# Patient Record
Sex: FEMALE | Race: OTHER | URBAN - METROPOLITAN AREA
[De-identification: names, ages, dates, MRNs, and addresses within clinical notes are randomized per-mention and may not be internally consistent; named-entity substitution may affect disease eponyms.]

---

## 2017-12-21 ENCOUNTER — EMERGENCY (EMERGENCY)
Facility: HOSPITAL | Age: 27
LOS: 1 days | Discharge: HOME | End: 2017-12-21
Attending: EMERGENCY MEDICINE | Admitting: EMERGENCY MEDICINE
Payer: COMMERCIAL

## 2017-12-21 VITALS
HEART RATE: 90 BPM | SYSTOLIC BLOOD PRESSURE: 107 MMHG | OXYGEN SATURATION: 100 % | RESPIRATION RATE: 18 BRPM | TEMPERATURE: 99 F | DIASTOLIC BLOOD PRESSURE: 70 MMHG

## 2017-12-21 VITALS
RESPIRATION RATE: 18 BRPM | DIASTOLIC BLOOD PRESSURE: 64 MMHG | OXYGEN SATURATION: 96 % | HEART RATE: 60 BPM | SYSTOLIC BLOOD PRESSURE: 110 MMHG | TEMPERATURE: 98 F

## 2017-12-21 DIAGNOSIS — R10.13 EPIGASTRIC PAIN: ICD-10-CM

## 2017-12-21 DIAGNOSIS — R11.2 NAUSEA WITH VOMITING, UNSPECIFIED: ICD-10-CM

## 2017-12-21 DIAGNOSIS — R19.7 DIARRHEA, UNSPECIFIED: ICD-10-CM

## 2017-12-21 DIAGNOSIS — J45.909 UNSPECIFIED ASTHMA, UNCOMPLICATED: ICD-10-CM

## 2017-12-21 LAB
APTT BLD: 31.2 SEC — SIGNIFICANT CHANGE UP (ref 27.5–37.4)
BASOPHILS NFR BLD AUTO: 0.1 % — SIGNIFICANT CHANGE UP (ref 0–2)
EOSINOPHIL NFR BLD AUTO: 0.6 % — SIGNIFICANT CHANGE UP (ref 0–6)
HCT VFR BLD CALC: 41.5 % — SIGNIFICANT CHANGE UP (ref 34.5–45)
HGB BLD-MCNC: 14.7 G/DL — SIGNIFICANT CHANGE UP (ref 11.5–15.5)
INR BLD: 1.14 — SIGNIFICANT CHANGE UP (ref 0.88–1.16)
LYMPHOCYTES # BLD AUTO: 9.4 % — LOW (ref 13–44)
MCHC RBC-ENTMCNC: 30.6 PG — SIGNIFICANT CHANGE UP (ref 27–34)
MCHC RBC-ENTMCNC: 35.4 G/DL — SIGNIFICANT CHANGE UP (ref 32–36)
MCV RBC AUTO: 86.5 FL — SIGNIFICANT CHANGE UP (ref 80–100)
MONOCYTES NFR BLD AUTO: 7.1 % — SIGNIFICANT CHANGE UP (ref 2–14)
NEUTROPHILS NFR BLD AUTO: 82.8 % — HIGH (ref 43–77)
PLATELET # BLD AUTO: 234 K/UL — SIGNIFICANT CHANGE UP (ref 150–400)
PROTHROM AB SERPL-ACNC: 12.7 SEC — SIGNIFICANT CHANGE UP (ref 9.8–12.7)
RBC # BLD: 4.8 M/UL — SIGNIFICANT CHANGE UP (ref 3.8–5.2)
RBC # FLD: 12.6 % — SIGNIFICANT CHANGE UP (ref 10.3–16.9)
WBC # BLD: 12.7 K/UL — HIGH (ref 3.8–10.5)
WBC # FLD AUTO: 12.7 K/UL — HIGH (ref 3.8–10.5)

## 2017-12-21 PROCEDURE — 99284 EMERGENCY DEPT VISIT MOD MDM: CPT | Mod: 25

## 2017-12-21 RX ORDER — ONDANSETRON 8 MG/1
4 TABLET, FILM COATED ORAL ONCE
Qty: 0 | Refills: 0 | Status: COMPLETED | OUTPATIENT
Start: 2017-12-21 | End: 2017-12-21

## 2017-12-21 RX ORDER — FAMOTIDINE 10 MG/ML
20 INJECTION INTRAVENOUS ONCE
Qty: 0 | Refills: 0 | Status: COMPLETED | OUTPATIENT
Start: 2017-12-21 | End: 2017-12-21

## 2017-12-21 RX ORDER — SODIUM CHLORIDE 9 MG/ML
1000 INJECTION INTRAMUSCULAR; INTRAVENOUS; SUBCUTANEOUS ONCE
Qty: 0 | Refills: 0 | Status: COMPLETED | OUTPATIENT
Start: 2017-12-21 | End: 2017-12-21

## 2017-12-21 RX ORDER — KETOROLAC TROMETHAMINE 30 MG/ML
30 SYRINGE (ML) INJECTION ONCE
Qty: 0 | Refills: 0 | Status: DISCONTINUED | OUTPATIENT
Start: 2017-12-21 | End: 2017-12-21

## 2017-12-21 RX ADMIN — ONDANSETRON 4 MILLIGRAM(S): 8 TABLET, FILM COATED ORAL at 23:22

## 2017-12-21 RX ADMIN — FAMOTIDINE 20 MILLIGRAM(S): 10 INJECTION INTRAVENOUS at 23:22

## 2017-12-21 RX ADMIN — SODIUM CHLORIDE 1000 MILLILITER(S): 9 INJECTION INTRAMUSCULAR; INTRAVENOUS; SUBCUTANEOUS at 23:22

## 2017-12-21 NOTE — ED PROVIDER NOTE - PROGRESS NOTE DETAILS
pt feeling better, no vomiting. abd soft.    I have discussed the discharge plan with the patient. The patient agrees with the plan, as discussed.  The patient understands Emergency Department diagnosis is a preliminary diagnosis often based on limited information and that the patient must adhere to the follow-up plan as discussed.  The patient understands that if the symptoms worsen or if prescribed medications do not have the desired/planned effect that the patient may return to the Emergency Department at any time for further evaluation and treatment.

## 2017-12-21 NOTE — ED PROVIDER NOTE - OBJECTIVE STATEMENT
27F hx asthma, c/o n/v/d. pt states symptoms ongoing since this afternoon.  +NBNB vomiting and loose watery diarrhea.  diffuse abdominal cramping. no fevers. no sick contacts. no recent travel.

## 2017-12-21 NOTE — ED PROVIDER NOTE - MEDICAL DECISION MAKING DETAILS
n/v/d, abd cramping, no rebound, no guarding, no evidence of surgical abd at this time, likely viral in origin.  -check labs, ivf, zofran, pepcid, toradol

## 2017-12-22 LAB
ALBUMIN SERPL ELPH-MCNC: 4.9 G/DL — SIGNIFICANT CHANGE UP (ref 3.3–5)
ALP SERPL-CCNC: 86 U/L — SIGNIFICANT CHANGE UP (ref 40–120)
ALT FLD-CCNC: 16 U/L — SIGNIFICANT CHANGE UP (ref 10–45)
ANION GAP SERPL CALC-SCNC: 16 MMOL/L — SIGNIFICANT CHANGE UP (ref 5–17)
AST SERPL-CCNC: 20 U/L — SIGNIFICANT CHANGE UP (ref 10–40)
BILIRUB SERPL-MCNC: 0.9 MG/DL — SIGNIFICANT CHANGE UP (ref 0.2–1.2)
BUN SERPL-MCNC: 15 MG/DL — SIGNIFICANT CHANGE UP (ref 7–23)
CALCIUM SERPL-MCNC: 10 MG/DL — SIGNIFICANT CHANGE UP (ref 8.4–10.5)
CHLORIDE SERPL-SCNC: 98 MMOL/L — SIGNIFICANT CHANGE UP (ref 96–108)
CO2 SERPL-SCNC: 21 MMOL/L — LOW (ref 22–31)
CREAT SERPL-MCNC: 0.77 MG/DL — SIGNIFICANT CHANGE UP (ref 0.5–1.3)
GLUCOSE SERPL-MCNC: 114 MG/DL — HIGH (ref 70–99)
HCG SERPL-ACNC: <.1 MIU/ML — SIGNIFICANT CHANGE UP
LIDOCAIN IGE QN: 18 U/L — SIGNIFICANT CHANGE UP (ref 7–60)
MAGNESIUM SERPL-MCNC: 1.8 MG/DL — SIGNIFICANT CHANGE UP (ref 1.6–2.6)
POTASSIUM SERPL-MCNC: 3.8 MMOL/L — SIGNIFICANT CHANGE UP (ref 3.5–5.3)
POTASSIUM SERPL-SCNC: 3.8 MMOL/L — SIGNIFICANT CHANGE UP (ref 3.5–5.3)
PROT SERPL-MCNC: 9.2 G/DL — HIGH (ref 6–8.3)
SODIUM SERPL-SCNC: 135 MMOL/L — SIGNIFICANT CHANGE UP (ref 135–145)

## 2017-12-22 PROCEDURE — 83690 ASSAY OF LIPASE: CPT

## 2017-12-22 PROCEDURE — 83735 ASSAY OF MAGNESIUM: CPT

## 2017-12-22 PROCEDURE — 85610 PROTHROMBIN TIME: CPT

## 2017-12-22 PROCEDURE — 84702 CHORIONIC GONADOTROPIN TEST: CPT

## 2017-12-22 PROCEDURE — 80053 COMPREHEN METABOLIC PANEL: CPT

## 2017-12-22 PROCEDURE — 85025 COMPLETE CBC W/AUTO DIFF WBC: CPT

## 2017-12-22 PROCEDURE — 36415 COLL VENOUS BLD VENIPUNCTURE: CPT

## 2017-12-22 PROCEDURE — 96375 TX/PRO/DX INJ NEW DRUG ADDON: CPT

## 2017-12-22 PROCEDURE — 99284 EMERGENCY DEPT VISIT MOD MDM: CPT | Mod: 25

## 2017-12-22 PROCEDURE — 85730 THROMBOPLASTIN TIME PARTIAL: CPT

## 2017-12-22 PROCEDURE — 96374 THER/PROPH/DIAG INJ IV PUSH: CPT

## 2017-12-22 RX ORDER — SODIUM CHLORIDE 9 MG/ML
1000 INJECTION INTRAMUSCULAR; INTRAVENOUS; SUBCUTANEOUS ONCE
Qty: 0 | Refills: 0 | Status: COMPLETED | OUTPATIENT
Start: 2017-12-22 | End: 2017-12-22

## 2017-12-22 RX ORDER — ONDANSETRON 8 MG/1
1 TABLET, FILM COATED ORAL
Qty: 15 | Refills: 0 | OUTPATIENT
Start: 2017-12-22 | End: 2017-12-26

## 2017-12-22 RX ADMIN — SODIUM CHLORIDE 1000 MILLILITER(S): 9 INJECTION INTRAMUSCULAR; INTRAVENOUS; SUBCUTANEOUS at 00:12

## 2017-12-22 RX ADMIN — Medication 30 MILLIGRAM(S): at 00:14

## 2019-11-19 PROBLEM — Z00.00 ENCOUNTER FOR PREVENTIVE HEALTH EXAMINATION: Status: ACTIVE | Noted: 2019-11-19

## 2019-12-24 ENCOUNTER — LABORATORY RESULT (OUTPATIENT)
Age: 29
End: 2019-12-24

## 2019-12-24 ENCOUNTER — APPOINTMENT (OUTPATIENT)
Dept: INTERNAL MEDICINE | Facility: CLINIC | Age: 29
End: 2019-12-24
Payer: MEDICARE

## 2019-12-24 VITALS
OXYGEN SATURATION: 99 % | SYSTOLIC BLOOD PRESSURE: 102 MMHG | WEIGHT: 186 LBS | HEART RATE: 70 BPM | TEMPERATURE: 98.8 F | DIASTOLIC BLOOD PRESSURE: 66 MMHG | BODY MASS INDEX: 28.19 KG/M2 | HEIGHT: 68 IN

## 2019-12-24 DIAGNOSIS — Z82.0 FAMILY HISTORY OF EPILEPSY AND OTHER DISEASES OF THE NERVOUS SYSTEM: ICD-10-CM

## 2019-12-24 DIAGNOSIS — Z80.3 FAMILY HISTORY OF MALIGNANT NEOPLASM OF BREAST: ICD-10-CM

## 2019-12-24 DIAGNOSIS — Z78.9 OTHER SPECIFIED HEALTH STATUS: ICD-10-CM

## 2019-12-24 DIAGNOSIS — Z82.49 FAMILY HISTORY OF ISCHEMIC HEART DISEASE AND OTHER DISEASES OF THE CIRCULATORY SYSTEM: ICD-10-CM

## 2019-12-24 DIAGNOSIS — Z87.09 PERSONAL HISTORY OF OTHER DISEASES OF THE RESPIRATORY SYSTEM: ICD-10-CM

## 2019-12-24 DIAGNOSIS — Z83.79 FAMILY HISTORY OF OTHER DISEASES OF THE DIGESTIVE SYSTEM: ICD-10-CM

## 2019-12-24 DIAGNOSIS — Z80.41 FAMILY HISTORY OF MALIGNANT NEOPLASM OF OVARY: ICD-10-CM

## 2019-12-24 PROCEDURE — 99213 OFFICE O/P EST LOW 20 MIN: CPT | Mod: 25

## 2019-12-24 PROCEDURE — 36415 COLL VENOUS BLD VENIPUNCTURE: CPT

## 2019-12-24 PROCEDURE — 99385 PREV VISIT NEW AGE 18-39: CPT | Mod: GY,25

## 2019-12-25 LAB
ALBUMIN SERPL ELPH-MCNC: 4.6 G/DL
ALP BLD-CCNC: 90 U/L
ALT SERPL-CCNC: 11 U/L
ANION GAP SERPL CALC-SCNC: 14 MMOL/L
AST SERPL-CCNC: 13 U/L
BASOPHILS # BLD AUTO: 0.04 K/UL
BASOPHILS NFR BLD AUTO: 0.5 %
BILIRUB SERPL-MCNC: 0.3 MG/DL
BUN SERPL-MCNC: 12 MG/DL
CALCIUM SERPL-MCNC: 9.5 MG/DL
CHLORIDE SERPL-SCNC: 105 MMOL/L
CHOLEST SERPL-MCNC: 125 MG/DL
CHOLEST/HDLC SERPL: 2.6 RATIO
CO2 SERPL-SCNC: 23 MMOL/L
CREAT SERPL-MCNC: 1.01 MG/DL
EOSINOPHIL # BLD AUTO: 0.14 K/UL
EOSINOPHIL NFR BLD AUTO: 1.8 %
ESTIMATED AVERAGE GLUCOSE: 100 MG/DL
GLUCOSE SERPL-MCNC: 87 MG/DL
HBA1C MFR BLD HPLC: 5.1 %
HBV CORE IGG+IGM SER QL: NONREACTIVE
HBV SURFACE AB SER QL: ABNORMAL
HBV SURFACE AG SER QL: NONREACTIVE
HCT VFR BLD CALC: 40 %
HCV AB SER QL: NONREACTIVE
HCV S/CO RATIO: 0.17 S/CO
HDLC SERPL-MCNC: 48 MG/DL
HEPATITIS A IGG ANTIBODY: REACTIVE
HGB BLD-MCNC: 13.5 G/DL
HIV1+2 AB SPEC QL IA.RAPID: NONREACTIVE
IMM GRANULOCYTES NFR BLD AUTO: 0.1 %
LDLC SERPL CALC-MCNC: 66 MG/DL
LYMPHOCYTES # BLD AUTO: 3.1 K/UL
LYMPHOCYTES NFR BLD AUTO: 39.7 %
MAN DIFF?: NORMAL
MCHC RBC-ENTMCNC: 31.6 PG
MCHC RBC-ENTMCNC: 33.8 GM/DL
MCV RBC AUTO: 93.7 FL
MONOCYTES # BLD AUTO: 0.61 K/UL
MONOCYTES NFR BLD AUTO: 7.8 %
NEUTROPHILS # BLD AUTO: 3.91 K/UL
NEUTROPHILS NFR BLD AUTO: 50.1 %
PLATELET # BLD AUTO: 203 K/UL
POTASSIUM SERPL-SCNC: 4.6 MMOL/L
PROT SERPL-MCNC: 7.3 G/DL
RBC # BLD: 4.27 M/UL
RBC # FLD: 13.2 %
SODIUM SERPL-SCNC: 142 MMOL/L
T PALLIDUM AB SER QL IA: NEGATIVE
TRIGL SERPL-MCNC: 57 MG/DL
WBC # FLD AUTO: 7.81 K/UL

## 2019-12-26 LAB
C TRACH RRNA SPEC QL NAA+PROBE: NOT DETECTED
N GONORRHOEA RRNA SPEC QL NAA+PROBE: NOT DETECTED
SOURCE AMPLIFICATION: NORMAL

## 2021-03-25 ENCOUNTER — APPOINTMENT (OUTPATIENT)
Dept: INTERNAL MEDICINE | Facility: CLINIC | Age: 31
End: 2021-03-25
Payer: COMMERCIAL

## 2021-03-25 ENCOUNTER — NON-APPOINTMENT (OUTPATIENT)
Age: 31
End: 2021-03-25

## 2021-03-25 VITALS
HEART RATE: 92 BPM | HEIGHT: 69 IN | TEMPERATURE: 97 F | WEIGHT: 196 LBS | BODY MASS INDEX: 29.03 KG/M2 | OXYGEN SATURATION: 95 % | SYSTOLIC BLOOD PRESSURE: 100 MMHG | DIASTOLIC BLOOD PRESSURE: 60 MMHG

## 2021-03-25 DIAGNOSIS — H92.02 OTALGIA, LEFT EAR: ICD-10-CM

## 2021-03-25 PROCEDURE — 99395 PREV VISIT EST AGE 18-39: CPT | Mod: 25

## 2021-03-25 PROCEDURE — 99072 ADDL SUPL MATRL&STAF TM PHE: CPT

## 2021-03-25 PROCEDURE — 99213 OFFICE O/P EST LOW 20 MIN: CPT | Mod: 25

## 2021-03-25 PROCEDURE — 36415 COLL VENOUS BLD VENIPUNCTURE: CPT

## 2021-03-26 LAB
25(OH)D3 SERPL-MCNC: 18.7 NG/ML
ALBUMIN SERPL ELPH-MCNC: 4.8 G/DL
ALP BLD-CCNC: 86 U/L
ALT SERPL-CCNC: 16 U/L
ANION GAP SERPL CALC-SCNC: 13 MMOL/L
AST SERPL-CCNC: 20 U/L
BASOPHILS # BLD AUTO: 0.03 K/UL
BASOPHILS NFR BLD AUTO: 0.3 %
BILIRUB SERPL-MCNC: 0.5 MG/DL
BUN SERPL-MCNC: 11 MG/DL
CALCIUM SERPL-MCNC: 9.4 MG/DL
CHLORIDE SERPL-SCNC: 102 MMOL/L
CHOLEST SERPL-MCNC: 134 MG/DL
CO2 SERPL-SCNC: 24 MMOL/L
CREAT SERPL-MCNC: 0.71 MG/DL
EOSINOPHIL # BLD AUTO: 0.18 K/UL
EOSINOPHIL NFR BLD AUTO: 2.1 %
ESTIMATED AVERAGE GLUCOSE: 100 MG/DL
GLUCOSE SERPL-MCNC: 95 MG/DL
HBA1C MFR BLD HPLC: 5.1 %
HBV CORE IGG+IGM SER QL: NONREACTIVE
HBV SURFACE AB SERPL IA-ACNC: 5 MIU/ML
HBV SURFACE AG SER QL: NONREACTIVE
HCT VFR BLD CALC: 40.9 %
HCV AB SER QL: NONREACTIVE
HCV S/CO RATIO: 0.06 S/CO
HDLC SERPL-MCNC: 45 MG/DL
HGB BLD-MCNC: 13.5 G/DL
IMM GRANULOCYTES NFR BLD AUTO: 0.2 %
LDLC SERPL CALC-MCNC: 72 MG/DL
LYMPHOCYTES # BLD AUTO: 2.78 K/UL
LYMPHOCYTES NFR BLD AUTO: 32.2 %
MAN DIFF?: NORMAL
MCHC RBC-ENTMCNC: 31.7 PG
MCHC RBC-ENTMCNC: 33 GM/DL
MCV RBC AUTO: 96 FL
MONOCYTES # BLD AUTO: 0.61 K/UL
MONOCYTES NFR BLD AUTO: 7.1 %
NEUTROPHILS # BLD AUTO: 5.01 K/UL
NEUTROPHILS NFR BLD AUTO: 58.1 %
NONHDLC SERPL-MCNC: 89 MG/DL
PLATELET # BLD AUTO: 267 K/UL
POTASSIUM SERPL-SCNC: 4.2 MMOL/L
PROT SERPL-MCNC: 7.2 G/DL
RBC # BLD: 4.26 M/UL
RBC # FLD: 13.5 %
SODIUM SERPL-SCNC: 139 MMOL/L
TRIGL SERPL-MCNC: 87 MG/DL
WBC # FLD AUTO: 8.63 K/UL

## 2021-03-29 ENCOUNTER — NON-APPOINTMENT (OUTPATIENT)
Age: 31
End: 2021-03-29

## 2021-04-02 ENCOUNTER — OUTPATIENT (OUTPATIENT)
Dept: OUTPATIENT SERVICES | Facility: HOSPITAL | Age: 31
LOS: 1 days | End: 2021-04-02
Payer: COMMERCIAL

## 2021-04-02 ENCOUNTER — APPOINTMENT (OUTPATIENT)
Dept: ULTRASOUND IMAGING | Facility: HOSPITAL | Age: 31
End: 2021-04-02
Payer: COMMERCIAL

## 2021-04-02 ENCOUNTER — NON-APPOINTMENT (OUTPATIENT)
Age: 31
End: 2021-04-02

## 2021-04-02 PROCEDURE — 76536 US EXAM OF HEAD AND NECK: CPT

## 2021-04-02 PROCEDURE — 76536 US EXAM OF HEAD AND NECK: CPT | Mod: 26

## 2021-04-15 ENCOUNTER — APPOINTMENT (OUTPATIENT)
Dept: COLORECTAL SURGERY | Facility: CLINIC | Age: 31
End: 2021-04-15
Payer: COMMERCIAL

## 2021-04-15 VITALS
TEMPERATURE: 98.6 F | HEIGHT: 69 IN | DIASTOLIC BLOOD PRESSURE: 71 MMHG | WEIGHT: 198 LBS | BODY MASS INDEX: 29.33 KG/M2 | SYSTOLIC BLOOD PRESSURE: 107 MMHG | HEART RATE: 80 BPM

## 2021-04-15 PROCEDURE — 99203 OFFICE O/P NEW LOW 30 MIN: CPT | Mod: 25

## 2021-04-15 PROCEDURE — 46500 INJECTION INTO HEMORRHOID(S): CPT

## 2021-04-15 PROCEDURE — 99072 ADDL SUPL MATRL&STAF TM PHE: CPT

## 2021-04-15 NOTE — PHYSICAL EXAM
[FreeTextEntry1] : Medical assistant present for duration of physical examination\par \par General no acute distress, alert and oriented\par Psych calm, pleasant demeanor, responding appropriately to questions\par Nonlabored breathing\par Ambulating without assistance\par Skin normal color and pigment, no visible lesions or rashes\par \par Anorectal Exam:\par Inspection no erythema, induration or fluctuance, no skin excoriation, no fissure, soft external hemorrhoidal tags nonthrombosed left lateral and right anterior, hemorrhoidal columns engorge with valsalva\par EARLINE nontender, no masses palpated, no blood on gloved finger\par \par Procedure: Anoscopy\par \par Pre procedure Diagnosis: hemorrhoids\par Post procedure Diagnosis: hemorrhoids\par Anesthesia: none\par Estimated blood loss: none\par Specimen: none\par Complications: none\par \par Consent obtained. Anoscopy was performed by passing a lighted anoscope with lubricant jelly into the anal canal and the entire anal mucosal surface was inspected. Findings included no fissure, large internal hemorrhoids, no visible masses or lesions, \par \par Patient tolerated examination and procedure well.\par \par \par

## 2021-04-15 NOTE — HISTORY OF PRESENT ILLNESS
[FreeTextEntry1] : 31 y/o F presents for evaluation of hemorrhoids, referred by PCP Dr. Paul Tapia\par Reports issues with internal and external hemorrhoids for several years, typically resolve on their own. Since December 2020 patient c/o issues with external hemorrhoids and prolapsing internal hemorrhoids that require manual reduction with every BM\par \par C/o intermittent aching pain with BM followed by a burning sensation that starts several hours after a BM and can last for several hours at a time. Treating with Motrin PRN but has noted needing it more frequently recently. Also reports intermittent itching and BRBPR on the TP and in the bowl \par Has tried suppositories by [employer] that provided improvement in swelling, last used 2 weeks ago. Never prescribed medications for hemorrhoids \par BH:BID. Admits to occasional harder stools \par Admits to straining \par No use of stool softener or laxatives currently but previously prescribed Colace and used until she ran out. Currently on a natural cleanse that she reports is mostly fiber, has completed 5/10 days\par Has been working on increasing dietary fiber intake. Currently drinking 6+ cups of water daily \par No sitz baths \par Never had a colonoscopy\par FMH Crohn's disease in mother \par No ASA in last 7 days. Took Motrin this morning

## 2021-04-15 NOTE — PROCEDURE
[FreeTextEntry1] : Injection Sclerotherapy\par \par Pre-procedure Diagnosis: Symptomatic Internal Hemorrhoids\par Post-procedure Diagnosis: Symptomatic Internal Hemorrhoids\par Procedure: Anoscopy with control of bleeding and injection sclerotherapy\par Anesthesia: none\par Estimated blood loss: minimal\par Specimen: none\par Drain: none\par Complications: none\par \par Indications: Patient presents with history of symptoms related to internal hemorrhoids. On physical exam, large internal hemorrhoids were detected. Risks/benefits/alternative were discussed and patient agreed to proceed with office based procedure.\par \par Procedure Details: Consent obtained. Patient was placed in a modified prone katie-knife position on the examination table. Digital rectal exam was performed with an index finger with surgical jelly lubricant. An anoscope was inserted into the anal canal, and a prominent hemorrhoidal bundle was identified in the right posterior position. A solution of 3ml of phenol mixed in cottonseed oil was injected into this hemorrhoid. Anoscopy with control of bleeding was performed using topical Monsel's solution and hemostasis was achieved. No active bleeding was noted. The anoscope was removed. The patient tolerated the procedure well.\par \par Post-procedure instructions were reviewed with the patient, including recommendation to notify office immediately for any symptoms of severe pain, bleeding, inability to urinate, fever, or any other concern. All questions answered.\par

## 2021-04-15 NOTE — ASSESSMENT
[FreeTextEntry1] : Exam findings and diagnosis were discussed at length with patient. \par Recommendations including increased fiber intake, adequate daily hydration, stool softeners as needed, and sitz baths as needed and after bowel movements were discussed.\par Avoid constipation, diarrhea and straining.\par Symptoms have continued despite supportive care and topical agents.\par Office based treatment, such as rubber band ligation or sclerotherapy, was discussed as an alternative if symptoms persist despite conservative management.\par Role of hemorrhoidectomy also discussed.\par Office procedure and surgical risks and benefits discussed.\par \par Patient concerned about post procedural/post surgical pain and recovery, but would like to pursue further intervention for hemorrhoids.\par After discussing options, she requests a trial of injection sclerotherapy, before considering rubber band ligation or hemorrhoidectomy.\par \par Right posterior hemorrhoid injected today.\par F/u in 3 weeks or sooner as needed. \par \par All questions answered, patient expressed understanding and is agreeable to this plan.\par

## 2021-05-06 ENCOUNTER — APPOINTMENT (OUTPATIENT)
Dept: COLORECTAL SURGERY | Facility: CLINIC | Age: 31
End: 2021-05-06
Payer: COMMERCIAL

## 2021-05-06 VITALS
HEIGHT: 69 IN | WEIGHT: 198 LBS | HEART RATE: 87 BPM | BODY MASS INDEX: 29.33 KG/M2 | SYSTOLIC BLOOD PRESSURE: 110 MMHG | TEMPERATURE: 98.2 F | DIASTOLIC BLOOD PRESSURE: 73 MMHG

## 2021-05-06 PROCEDURE — 46500 INJECTION INTO HEMORRHOID(S): CPT

## 2021-05-06 PROCEDURE — 99072 ADDL SUPL MATRL&STAF TM PHE: CPT

## 2021-05-06 NOTE — ASSESSMENT
[FreeTextEntry1] : Exam findings were discussed at length with patient.\par Hemorrhoid symptoms improving but not fully resolved since initiation of sclerotherapy. Requests sclerotherapy be completed prior to considering any further interventions, such as rubber band ligation or surgery\par 2nd treatment performed today; requests 2 hemorrhoids be injected - left lateral and right anterior treated.\par Post-procedure instructions were reviewed with the patient\par Continue bowel regimen, sitz baths, and hydrocortisone prn\par F/u in 3 weeks for reevaluation if symptoms persist\par All questions were answered, patient expressed understanding, and is agreeable to this plan.\par

## 2021-05-06 NOTE — PHYSICAL EXAM
[FreeTextEntry1] : Medical assistant present for duration of physical examination\par \par General no acute distress, alert and oriented\par Psych calm, pleasant demeanor, responding appropriately to questions\par Nonlabored breathing\par Ambulating without assistance\par Skin normal color and pigment, no visible lesions or rashes\par \par Anorectal Exam:\par Inspection no erythema, induration or fluctuance, no skin excoriation, no fissure, soft external hemorrhoidal tags \par EARLINE nontender, no masses palpated, no blood on gloved finger\par \par

## 2021-05-06 NOTE — HISTORY OF PRESENT ILLNESS
[FreeTextEntry1] : 31 y/o F presents for f/u hemorrhoids\par Last seen in the office on 4/15/21. Soft external hemorrhoidal tags in the left lateral and right anterior quadrant noted on exam. Hemorrhoidal columns were observed to become engorged with Valsalva. Large internal hemorrhoids were seen on anoscopy.  \par Sclerotherapy of the RPQ performed. Patient reports some improvement in symptoms\par \par C/o continued aching pain with BM followed by a burning sensation that starts several hours after a BM and can last for several hours at a time. Treating with Motrin PRN \par Reports continued intermittent issues with itching and BRBPR on the TP \par BH: 2-3 times daily\par Admit to occasional straining. Denies constipation or diarrhea\par Using stool softeners PRN \par Has been working on increasing dietary fiber intake. Currently drinking 6+ cups of water daily \par Never had a colonoscopy\par FMH Crohn's disease in mother \par  No ASA last 7 days. Took Motrin this morning

## 2021-05-06 NOTE — PROCEDURE
[FreeTextEntry1] : Injection Sclerotherapy\par \par Pre-procedure Diagnosis: Symptomatic Internal Hemorrhoids\par Post-procedure Diagnosis: Symptomatic Internal Hemorrhoids\par Procedure: Anoscopy with control of bleeding and injection sclerotherapy\par Anesthesia: none\par Estimated blood loss: minimal\par Specimen: none\par Drain: none\par Complications: none\par \par Procedure Details: Consent obtained. Patient was placed in a modified prone katie-knife position on the examination table. Digital rectal exam was performed with an index finger with surgical jelly lubricant. An anoscope was inserted into the anal canal, and a prominent hemorrhoidal bundle was identified in the left lateral and right anterior position. A solution of 3ml of phenol mixed in cottonseed oil was injected into each of these hemorrhoids. Anoscopy with control of bleeding was performed using topical Monsel's solution and hemostasis was achieved. No active bleeding was noted. The anoscope was removed. The patient tolerated the procedure well.\par \par \par

## 2021-05-27 ENCOUNTER — APPOINTMENT (OUTPATIENT)
Dept: COLORECTAL SURGERY | Facility: CLINIC | Age: 31
End: 2021-05-27
Payer: COMMERCIAL

## 2021-05-27 VITALS
BODY MASS INDEX: 28.73 KG/M2 | DIASTOLIC BLOOD PRESSURE: 79 MMHG | HEIGHT: 69 IN | HEART RATE: 109 BPM | TEMPERATURE: 97.8 F | WEIGHT: 194 LBS | SYSTOLIC BLOOD PRESSURE: 129 MMHG

## 2021-05-27 DIAGNOSIS — K62.5 HEMORRHAGE OF ANUS AND RECTUM: ICD-10-CM

## 2021-05-27 PROCEDURE — 46600 DIAGNOSTIC ANOSCOPY SPX: CPT

## 2021-05-27 NOTE — ASSESSMENT
[FreeTextEntry1] : Exam findings and diagnosis were discussed at length with patient.\par Hemorrhoid symptoms resolved s/p sclerotherapy\par Continue bowel regimen, sitz baths, and hydrocortisone prn\par \par Patient requesting full evaluation of the colon and rectum with colonoscopy given family history of IBD and diverticulitis and patient's prior symptoms of rectal bleeding.\par All pertinent issues were discussed with the patient and the concern for the possibility of neoplasm or inflammatory condition was explained.\par The importance of full evaluation of the colon and rectum with colonoscopy was stressed.\par The procedure was explained in full detail, including prep selected, procedure length and time from admission to discharge.\par The need for stopping certain medications has been explained in full detail.\par Risks, benefits, and alternatives of colonoscopy, including but not limited to incomplete penetration of the colon and possible need for further work up, perforation, hemorrhage, either immediate or delayed, with necessity for hospitalization, possible blood transfusions, possible repeat colonoscopy and possible operation, were explained.\par All questions were answered, the patient expressed understanding, and would like to proceed with scheduling colonoscopy. \par Informed consent was obtained.\par \par

## 2021-05-27 NOTE — HISTORY OF PRESENT ILLNESS
[FreeTextEntry1] : 31 y/o F presents for f/u hemorrhoids\par s/p sclerotherapy to RPQ 4/15/21, s/p sclerotherapy to LL and RAQ 5/6/21\par \par Denies pain, itching, bleeding\par BH:2-3 times daily \par Denies constipation, diarrhea or straining\par Previously using stool softeners, not currently using \par Reports adequate dietary fiber intake. Currently drinking 3-4 16 oz. bottles of water daily  \par Never had a colonoscopy but requesting colonoscopy 2/2 [facility]\par FMH Crohn's disease in mother and diverticulitis in father\par  No ASA/NSAIDs last 7 days\par

## 2021-05-27 NOTE — PHYSICAL EXAM
[FreeTextEntry1] : Medical assistant present for duration of physical examination\par \par General no acute distress, alert and oriented\par Psych calm, pleasant \par Nonlabored breathing\par Ambulating without assistance\par Skin normal color and pigment, no visible lesions or rashes\par \par Anorectal Exam:\par Inspection no erythema, induration or fluctuance, no skin excoriation, no fissure, soft external hemorrhoidal tags \par EARLINE nontender, no masses palpated, no blood on gloved finger\par \par Procedure: Anoscopy\par \par Pre procedure Diagnosis: hemorrhoids\par Post procedure Diagnosis: hemorrhoids\par Anesthesia: none\par Estimated blood loss: none\par Specimen: none\par Complications: none\par \par Consent obtained. Anoscopy was performed by passing a lighted anoscope with lubricant jelly into the anal canal and the entire anal mucosal surface was inspected. Findings included no fissure, resolved inflammation of internal hemorrhoid tissues, no visible masses or lesions\par \par Patient tolerated examination and procedure well.\par \par \par \par \par \par

## 2021-06-29 ENCOUNTER — APPOINTMENT (OUTPATIENT)
Dept: OBGYN | Facility: CLINIC | Age: 31
End: 2021-06-29
Payer: COMMERCIAL

## 2021-06-29 VITALS
BODY MASS INDEX: 29.03 KG/M2 | SYSTOLIC BLOOD PRESSURE: 110 MMHG | HEIGHT: 69 IN | DIASTOLIC BLOOD PRESSURE: 80 MMHG | WEIGHT: 196 LBS

## 2021-06-29 DIAGNOSIS — Z01.419 ENCOUNTER FOR GYNECOLOGICAL EXAMINATION (GENERAL) (ROUTINE) W/OUT ABNORMAL FINDINGS: ICD-10-CM

## 2021-06-29 PROCEDURE — 76830 TRANSVAGINAL US NON-OB: CPT

## 2021-06-29 PROCEDURE — 99385 PREV VISIT NEW AGE 18-39: CPT

## 2021-07-06 NOTE — DISCUSSION/SUMMARY
[FreeTextEntry1] : 29 yo presents for well women visit with intermittent pelvic pain\par \par HCM: pap done, STI done, Gardasil completed\par \par Ovarian cyst: discussed causes of cysts, reviewed precautions, follow-up for ultrasound in 2 months \par \par -f/u 1 year/ 2 months cyst/PRN

## 2021-07-06 NOTE — PROCEDURE
[Cervical Pap Smear] : cervical Pap smear [Liquid Base] : liquid base [GC Chlamydia Culture] : GC Chlamydia Culture [Tolerated Well] : the patient tolerated the procedure well [No Complications] : there were no complications [Pelvic Pain] : pelvic pain [Transvaginal Ultrasound] : transvaginal ultrasound [Color Doppler Imaging] : color doppler imaging [Anteverted] : anteverted [L: ___ cm] : L: [unfilled] cm [W: ___cm] : W: [unfilled] cm [H: ___ cm] : H: [unfilled] cm [FreeTextEntry5] : endometrium 0.6 [FreeTextEntry7] : 3.0 x 1.5 [FreeTextEntry8] : 4.4 x 3.9 with 3.4 x 3.4 hemorrhagic cyst

## 2021-07-06 NOTE — HISTORY OF PRESENT ILLNESS
[N] : Patient does not use contraception [Y] : Patient is sexually active [Normal Amount/Duration] :  normal amount and duration [Regular Cycle Intervals] : periods have been regular [Menarche Age: ____] : age at menarche was [unfilled] [Currently Active] : currently active [Men] : men [LMPDate] : 06/04/2021 [MensesFreq] : 28 [MensesLength] : 6 [FreeTextEntry1] : 06/04/2021

## 2021-07-21 LAB
C TRACH RRNA SPEC QL NAA+PROBE: NOT DETECTED
CYTOLOGY CVX/VAG DOC THIN PREP: NORMAL
HBV SURFACE AG SER QL: NONREACTIVE
HCV AB SER QL: NONREACTIVE
HCV S/CO RATIO: 0.09 S/CO
HIV1+2 AB SPEC QL IA.RAPID: NONREACTIVE
HPV HIGH+LOW RISK DNA PNL CVX: NOT DETECTED
N GONORRHOEA RRNA SPEC QL NAA+PROBE: NOT DETECTED
SOURCE AMPLIFICATION: NORMAL
T PALLIDUM AB SER QL IA: NEGATIVE

## 2021-07-23 ENCOUNTER — APPOINTMENT (OUTPATIENT)
Dept: COLORECTAL SURGERY | Facility: HOSPITAL | Age: 31
End: 2021-07-23

## 2021-07-23 ENCOUNTER — NON-APPOINTMENT (OUTPATIENT)
Age: 31
End: 2021-07-23

## 2021-10-28 ENCOUNTER — TRANSCRIPTION ENCOUNTER (OUTPATIENT)
Age: 31
End: 2021-10-28

## 2021-11-25 ENCOUNTER — TRANSCRIPTION ENCOUNTER (OUTPATIENT)
Age: 31
End: 2021-11-25

## 2022-01-21 ENCOUNTER — TRANSCRIPTION ENCOUNTER (OUTPATIENT)
Age: 32
End: 2022-01-21

## 2022-07-07 ENCOUNTER — APPOINTMENT (OUTPATIENT)
Dept: COLORECTAL SURGERY | Facility: CLINIC | Age: 32
End: 2022-07-07

## 2022-07-07 ENCOUNTER — NON-APPOINTMENT (OUTPATIENT)
Age: 32
End: 2022-07-07

## 2022-07-07 VITALS
DIASTOLIC BLOOD PRESSURE: 68 MMHG | SYSTOLIC BLOOD PRESSURE: 109 MMHG | HEART RATE: 118 BPM | WEIGHT: 199 LBS | BODY MASS INDEX: 29.47 KG/M2 | TEMPERATURE: 98.3 F | HEIGHT: 69 IN

## 2022-07-07 PROCEDURE — 46221 LIGATION OF HEMORRHOID(S): CPT

## 2022-07-07 NOTE — PHYSICAL EXAM
[FreeTextEntry1] : Medical assistant present for duration of physical examination\par \par General no acute distress, alert and oriented\par Psych calm, responding appropriately to questions\par Nonlabored breathing\par Ambulating without assistance\par Skin normal color and pigment\par \par \par Anorectal Exam:\par Inspection no skin changes, no fissure, soft non-inflamed residual external hemorrhoidal tags left lateral and posteriorly\par EARLINE nontender, no masses palpated, no blood on gloved finger\par \par Procedure: Anoscopy\par \par Pre procedure Diagnosis: hemorrhoids\par Post procedure Diagnosis: hemorrhoids\par Anesthesia: none\par Estimated blood loss: none\par Specimen: none\par Complications: none\par \par Consent obtained. Anoscopy was performed by passing a lighted anoscope with lubricant jelly into the anal canal and the entire anal mucosal surface was inspected. Findings included no fissure, moderate to large internal hemorrhoids ( right anterior > left lateral > right posterior)\par \par Patient tolerated examination and procedure well.\par \par \par

## 2022-07-07 NOTE — ASSESSMENT
[FreeTextEntry1] : Exam findings and diagnosis were discussed at length with patient.\par H/o prior sclerotherapy, hemorrhoids have persisted and progressed to hemorrhoidal prolapse.\par Management options discussed.\par Recommend rubber band ligation. Patient agreeable and understands multiple treatments may be performed for multiple hemorrhoids.\par First ligation performed today - right anterior.\par Post-procedure instructions were reviewed with the patient, including recommendation to notify office immediately for any symptoms of severe pain, bleeding, inability to urinate, fever, or any other concern. \par F/u 3 weeks or sooner as needed.\par Colonoscopy scheduling when patient agreeable.\par All questions were answered, patient expressed understanding, and is agreeable to this plan.\par

## 2022-07-07 NOTE — HISTORY OF PRESENT ILLNESS
[FreeTextEntry1] : 30 y/o F presents for hemorrhoids \par \par s/p sclerotherapy to RPQ 4/15/21, s/p sclerotherapy to LL and RAQ 5/6/21\par \par Last seen in the office 5/27/21: Soft external hemorrhoidal tags. Resolved inflammation of internal hemorrhoidal tissue, no visible masses or lesions on anoscopy. \par Patient recommended c/w bowel regimen, sitz baths and hydrocortisone PRN.\par \par Patient was scheduled for colonoscopy 7/23/21 - she was a no show. Endoscopy staff reached out to patient, [place]. 7/28 office staff attempted to reach pt to reschedule colonoscopy but voicemail full. \par \par She returns today c/o persistent hemorrhoids\par \par c/o intermittent hemorrhoidal flares in setting of pebbly stools or larger BMs and straining which she takes stool softener as needed w/ improvement in stooling. \par Admits to prolapsing tissue after most BMs that requires manual reduction. She may go a few weeks w/o any hemorrhoidal discomfort and then has constipation despite eating plenty of fiber and drinking water. Experienced a flare a couple weeks ago associated w/ scant BRB on TP, swollen external tissue and continues to have prolapsing tissue w/ BMs. Pt used HC cream and applies ice w/ improvement or inserts frozen aloe suppository\par \par BH: typically daily and larger BMs\par Not taking fiber supplement\par \par Took Motrin today for back pain. Denies ASA/excedrin\par FMH Crohn's disease in mother and diverticulitis in father\par Pt reports she "chickened out" in regards to proceeding with the colonoscopy. She will consider rescheduling

## 2022-08-04 ENCOUNTER — APPOINTMENT (OUTPATIENT)
Dept: COLORECTAL SURGERY | Facility: CLINIC | Age: 32
End: 2022-08-04

## 2022-08-04 VITALS
WEIGHT: 200 LBS | BODY MASS INDEX: 29.62 KG/M2 | DIASTOLIC BLOOD PRESSURE: 78 MMHG | HEART RATE: 109 BPM | SYSTOLIC BLOOD PRESSURE: 123 MMHG | TEMPERATURE: 97.7 F | HEIGHT: 69 IN

## 2022-08-04 PROCEDURE — 46221 LIGATION OF HEMORRHOID(S): CPT

## 2022-08-04 NOTE — PHYSICAL EXAM
[FreeTextEntry1] : Medical assistant present for duration of physical examination\par \par General no acute distress, alert and oriented\par Psych calm, pleasant, in good spirits\par Nonlabored breathing\par Ambulating without assistance\par Skin normal color and pigment\par \par \par Anorectal Exam:\par Inspection no cellulitis, no fissure, soft non-inflamed residual external hemorrhoidal tags left lateral and posteriorly\par \par

## 2022-08-04 NOTE — ASSESSMENT
[FreeTextEntry1] : Exam findings were discussed at length with patient.\par Hemorrhoid symptoms improving but not fully resolved since initiation of rubber banding\par 2nd treatment performed today - right posterior hemorrhoid\par Post-procedure instructions were reviewed with the patient\par Continue bowel regimen, sitz baths, and hydrocortisone prn\par F/u 3 weeks or sooner as needed.\par All questions were answered, patient expressed understanding, and is agreeable to this plan.\par

## 2022-08-04 NOTE — HISTORY OF PRESENT ILLNESS
[FreeTextEntry1] : 30 y/o F presents for f/u hemorrhoids \par \par s/p sclerotherapy to RPQ 4/15/21, s/p sclerotherapy to LL and RAQ 5/6/21 s/p RBL of RA 7/7/22\par \par Seen in the office 5/27/21: Soft external hemorrhoidal tags. Resolved inflammation of internal hemorrhoidal tissue, no visible masses or lesions on anoscopy. \par Patient recommended c/w bowel regimen, sitz baths and hydrocortisone PRN.\par \par Patient was scheduled for colonoscopy 7/23/21 - she was a no show. Endoscopy staff reached out to patient, [place]. 7/28 office staff attempted to reach pt to reschedule colonoscopy but voicemail full. \par FMH Crohn's disease in mother and diverticulitis in father\par \par Most recently seen in the office on 7/72022: c/o intermittent hemorrhoidal flares in the setting of pebbly stools or larger BMs. Admits prolapsing tissue after most BMS requiring manual reduction. Hemorrhoidal symptoms intermittent and experienced a flare a couple of weeks ago associated w/scant BRB on TP. Used HC cream, ice / inserts frozen aloe suppository. \par \par  Exam notable for soft non-inflamed residual external hemorrhoidal tags left lateral and posteriorly. Moderate to large internal hemorrhoids (RA > LL > RP) on anoscopy. RBL performed to right anterior hemorrhoid\par \par Returns today for follow up and continued treatment \par First few hours post treatment felt pain, took Motrin.\par Felt better after first day.\par Noticed BRB 1-2 weeks ago, none since.\par Using hydrocortisone prn. \par \par Hemorrhoid symptoms feel improving since starting treatment.\par Trying to avoid hard stools, improving diet, only using stool softeners as needed.\par If stools are hard, she feels hemorrhoid symptoms. \par \par Denies ASA or anticoagulation in past week.

## 2022-08-04 NOTE — PROCEDURE
[FreeTextEntry1] : Rubber Band Ligation\par \par Pre-procedure Diagnosis: Symptomatic Internal Hemorrhoids\par Post-procedure Diagnosis: Symptomatic Internal Hemorrhoids\par Procedure: Anoscopy with Rubber Band Ligation\par Anesthesia: none\par Estimated blood loss: minimal\par Specimen: none\par Drain: none\par Complications: none\par \par Procedure Details: Consent obtained. Patient was placed in a modified prone katie-knife position on the examination table. Digital rectal exam was performed with an index finger with surgical jelly lubricant. An anoscope was inserted into the anal canal. A post ligation scar was seen right anterior. A moderate to large hemorrhoidal bundle was identified in the right posterior position. Using the hemorrhoid ligation device, a rubber band was placed around this hemorrhoid. No active bleeding was noted. A mild to moderate left lateral internal hemorrhoid was also seen. The anoscope was removed. The patient tolerated the procedure well.\par \par

## 2022-08-21 ENCOUNTER — NON-APPOINTMENT (OUTPATIENT)
Age: 32
End: 2022-08-21

## 2022-08-30 ENCOUNTER — APPOINTMENT (OUTPATIENT)
Dept: COLORECTAL SURGERY | Facility: CLINIC | Age: 32
End: 2022-08-30

## 2022-09-14 ENCOUNTER — APPOINTMENT (OUTPATIENT)
Dept: COLORECTAL SURGERY | Facility: CLINIC | Age: 32
End: 2022-09-14

## 2022-09-14 VITALS
HEART RATE: 95 BPM | HEIGHT: 69 IN | SYSTOLIC BLOOD PRESSURE: 97 MMHG | DIASTOLIC BLOOD PRESSURE: 58 MMHG | BODY MASS INDEX: 29.33 KG/M2 | TEMPERATURE: 98.7 F | WEIGHT: 198 LBS

## 2022-09-14 DIAGNOSIS — K64.8 OTHER HEMORRHOIDS: ICD-10-CM

## 2022-09-14 PROCEDURE — 46600 DIAGNOSTIC ANOSCOPY SPX: CPT

## 2022-09-14 NOTE — PHYSICAL EXAM
[FreeTextEntry1] : Medical assistant present for duration of physical examination\par \par General no acute distress, alert and oriented\par Psych calm, pleasant \par Nonlabored breathing\par Ambulating without assistance\par Skin no skin changes\par \par Anorectal Exam:\par Inspection no erythema,  no fissure, soft non-inflamed residual external hemorrhoidal tags left lateral and posteriorly\par EARLINE mildly tender, no masses palpated, no blood on gloved finger\par \par Procedure: Anoscopy\par \par Pre procedure Diagnosis: hemorrhoids\par Post procedure Diagnosis: hemorrhoids\par Anesthesia: none\par Estimated blood loss: none\par Specimen: none\par Complications: none\par \par Consent obtained. Anoscopy was performed by passing a lighted anoscope with lubricant jelly into the anal canal and the entire anal mucosal surface was inspected. Limited exam due to pain. Findings included moderate hemorrhoidal inflammation, friable with BRB with contact of hemorrhoidal column \par \par Patient tolerated examination and procedure well.\par \par \par

## 2022-09-14 NOTE — HISTORY OF PRESENT ILLNESS
[FreeTextEntry1] : 30 y/o F presents for f/u hemorrhoids \par \par s/p sclerotherapy to RPQ 4/15/21, s/p sclerotherapy to LL and RAQ 5/6/21\par \par s/p RBL of RA hemorrhoid 7/7/22 and right posterior hemorrhoid 8/4/2022\par \par Returns today for follow up \par She reports pain for first 1-2 weeks after last treatment.\par Now discomfort with BMs, sometimes pain before she moves bowels.\par No fevers. \par \par Still seeing BRB, sometimes on TP and sometimes in bowl\par BMs are regular, not hard. Taking fiber gummies and fiber cookies, having vegetables daily.\par \par Requesting suppository instead of cream due to difficutly with applying cream. \par She is not sure if she wants further treatment today. \par \par Never had a colonoscopy.  Patient was scheduled for colonoscopy 7/23/21 - she was a no show for appointment.

## 2022-09-14 NOTE — ASSESSMENT
[FreeTextEntry1] : Previously had RBL to RA and RP hemorrhoids. \par She would like more time with supportive care prior to continuing with hemorrhoid treatment.\par We discussed if she has continued symptoms despite rubber band ligations, surgery is an alternative.\par We also discussed consideration for colonoscopy if BRB continues despite management of hemorrhoids.\par F/u 1 month.  AZ prescription sent\par All questions were answered, patient expressed understanding, and is agreeable to this plan.\par \par

## 2022-10-06 ENCOUNTER — NON-APPOINTMENT (OUTPATIENT)
Age: 32
End: 2022-10-06

## 2022-10-06 ENCOUNTER — APPOINTMENT (OUTPATIENT)
Dept: INTERNAL MEDICINE | Facility: CLINIC | Age: 32
End: 2022-10-06

## 2022-10-06 VITALS
TEMPERATURE: 97.3 F | BODY MASS INDEX: 29.33 KG/M2 | HEART RATE: 94 BPM | WEIGHT: 198 LBS | SYSTOLIC BLOOD PRESSURE: 126 MMHG | DIASTOLIC BLOOD PRESSURE: 65 MMHG | HEIGHT: 69 IN | OXYGEN SATURATION: 96 %

## 2022-10-06 DIAGNOSIS — Z78.9 OTHER SPECIFIED HEALTH STATUS: ICD-10-CM

## 2022-10-06 DIAGNOSIS — Z87.09 PERSONAL HISTORY OF OTHER DISEASES OF THE RESPIRATORY SYSTEM: ICD-10-CM

## 2022-10-06 DIAGNOSIS — R59.0 LOCALIZED ENLARGED LYMPH NODES: ICD-10-CM

## 2022-10-06 DIAGNOSIS — Z00.00 ENCOUNTER FOR GENERAL ADULT MEDICAL EXAMINATION W/OUT ABNORMAL FINDINGS: ICD-10-CM

## 2022-10-06 PROCEDURE — 36415 COLL VENOUS BLD VENIPUNCTURE: CPT

## 2022-10-06 PROCEDURE — 99395 PREV VISIT EST AGE 18-39: CPT | Mod: 25

## 2022-10-06 RX ORDER — ALBUTEROL SULFATE 90 UG/1
108 (90 BASE) AEROSOL, METERED RESPIRATORY (INHALATION) EVERY 4 HOURS
Qty: 1 | Refills: 1 | Status: DISCONTINUED | COMMUNITY
Start: 2019-12-24 | End: 2022-10-06

## 2022-10-07 LAB
C TRACH RRNA SPEC QL NAA+PROBE: NOT DETECTED
HCV AB SER QL: NONREACTIVE
HCV S/CO RATIO: 0.1 S/CO
HIV1+2 AB SPEC QL IA.RAPID: NONREACTIVE
N GONORRHOEA RRNA SPEC QL NAA+PROBE: NOT DETECTED
SOURCE AMPLIFICATION: NORMAL
T PALLIDUM AB SER QL IA: NEGATIVE

## 2022-10-25 ENCOUNTER — APPOINTMENT (OUTPATIENT)
Dept: COLORECTAL SURGERY | Facility: CLINIC | Age: 32
End: 2022-10-25

## 2022-11-22 ENCOUNTER — APPOINTMENT (OUTPATIENT)
Dept: INTERNAL MEDICINE | Facility: CLINIC | Age: 32
End: 2022-11-22

## 2022-12-07 ENCOUNTER — NON-APPOINTMENT (OUTPATIENT)
Age: 32
End: 2022-12-07

## 2022-12-27 ENCOUNTER — APPOINTMENT (OUTPATIENT)
Dept: INTERNAL MEDICINE | Facility: CLINIC | Age: 32
End: 2022-12-27
Payer: COMMERCIAL

## 2022-12-27 VITALS
DIASTOLIC BLOOD PRESSURE: 72 MMHG | WEIGHT: 190 LBS | OXYGEN SATURATION: 95 % | BODY MASS INDEX: 28.14 KG/M2 | HEIGHT: 69 IN | TEMPERATURE: 98 F | SYSTOLIC BLOOD PRESSURE: 101 MMHG | HEART RATE: 104 BPM

## 2022-12-27 DIAGNOSIS — Z23 ENCOUNTER FOR IMMUNIZATION: ICD-10-CM

## 2022-12-27 PROCEDURE — 99213 OFFICE O/P EST LOW 20 MIN: CPT

## 2022-12-28 ENCOUNTER — NON-APPOINTMENT (OUTPATIENT)
Age: 32
End: 2022-12-28

## 2022-12-28 LAB
HBV CORE IGG+IGM SER QL: NONREACTIVE
HBV SURFACE AB SER QL: REACTIVE
HBV SURFACE AB SERPL IA-ACNC: 989.9 MIU/ML
HBV SURFACE AG SER QL: NONREACTIVE

## 2023-03-06 NOTE — PROCEDURE
Post op follow up phone call completed  Pt is sipping liquids, only consuming about 24-30 oz per day  Pt states she feel full after only a few sips so she has to stop and wait for feeling to subside  Encouraged pt to use timer to remind to drink  Pt was feeling light headed and feeling more sore after cleaning kitchen  Reminded pt she is recovering and to listen to her body  Using IS as instructed, reinforced importance of using IS to help prevent pneumonia  Ambulating about home without difficulty  Minimal pain, not using Oxycodone  Reaffirmed examples of liquid diet over the next week  Passing gas and had BM  Pt stated understanding about discharge instructions and medication adjustments  Tolerating administration of Lovenox injections by boyfriend without difficulty  Follow up appt with surgeon scheduled for next week  Instructed to call with any additional questions or concerns  [FreeTextEntry1] : Rubber Band Ligation\par \par Pre-procedure Diagnosis: Symptomatic Internal Hemorrhoids\par Post-procedure Diagnosis: Symptomatic Internal Hemorrhoids\par Procedure: Anoscopy with Rubber Band Ligation\par Anesthesia: none\par Estimated blood loss: minimal\par Specimen: none\par Drain: none\par Complications: none\par \par Indications: Patient presents with history of symptoms related to internal hemorrhoids. On physical exam, moderate to large internal hemorrhoids were detected. Risks/benefits/alternative were discussed and patient agreed to proceed with office based procedure.\par \par Procedure Details: Consent obtained. Patient was placed in a modified prone katie-knife position on the examination table. Digital rectal exam was performed with an index finger with surgical jelly lubricant. An anoscope was inserted into the anal canal, and a prominent hemorrhoidal bundle was identified in the right anterior position. Using the hemorrhoid ligation device, a rubber band was placed around this hemorrhoid. No active bleeding was noted. The anoscope was removed. The patient tolerated the procedure well.\par \par \par

## 2023-06-15 ENCOUNTER — APPOINTMENT (OUTPATIENT)
Dept: INTERNAL MEDICINE | Facility: CLINIC | Age: 33
End: 2023-06-15
Payer: COMMERCIAL

## 2023-06-15 ENCOUNTER — NON-APPOINTMENT (OUTPATIENT)
Age: 33
End: 2023-06-15

## 2023-06-15 VITALS
TEMPERATURE: 97.2 F | DIASTOLIC BLOOD PRESSURE: 75 MMHG | HEART RATE: 69 BPM | BODY MASS INDEX: 28.14 KG/M2 | OXYGEN SATURATION: 95 % | SYSTOLIC BLOOD PRESSURE: 110 MMHG | WEIGHT: 190 LBS | HEIGHT: 69 IN

## 2023-06-15 DIAGNOSIS — L70.9 ACNE, UNSPECIFIED: ICD-10-CM

## 2023-06-15 DIAGNOSIS — M25.562 PAIN IN LEFT KNEE: ICD-10-CM

## 2023-06-15 PROCEDURE — 99214 OFFICE O/P EST MOD 30 MIN: CPT

## 2023-10-12 ENCOUNTER — LABORATORY RESULT (OUTPATIENT)
Age: 33
End: 2023-10-12

## 2023-10-12 ENCOUNTER — APPOINTMENT (OUTPATIENT)
Dept: INTERNAL MEDICINE | Facility: CLINIC | Age: 33
End: 2023-10-12
Payer: COMMERCIAL

## 2023-10-12 VITALS
HEIGHT: 69 IN | HEART RATE: 75 BPM | OXYGEN SATURATION: 99 % | WEIGHT: 188 LBS | DIASTOLIC BLOOD PRESSURE: 82 MMHG | BODY MASS INDEX: 27.85 KG/M2 | TEMPERATURE: 97.7 F | SYSTOLIC BLOOD PRESSURE: 121 MMHG

## 2023-10-12 DIAGNOSIS — Z11.1 ENCOUNTER FOR SCREENING FOR RESPIRATORY TUBERCULOSIS: ICD-10-CM

## 2023-10-12 DIAGNOSIS — Z11.3 ENCOUNTER FOR SCREENING FOR INFECTIONS WITH A PREDOMINANTLY SEXUAL MODE OF TRANSMISSION: ICD-10-CM

## 2023-10-12 DIAGNOSIS — F41.9 ANXIETY DISORDER, UNSPECIFIED: ICD-10-CM

## 2023-10-12 DIAGNOSIS — N83.202 UNSPECIFIED OVARIAN CYST, LEFT SIDE: ICD-10-CM

## 2023-10-12 PROCEDURE — 99214 OFFICE O/P EST MOD 30 MIN: CPT | Mod: 25

## 2023-10-12 PROCEDURE — 36415 COLL VENOUS BLD VENIPUNCTURE: CPT

## 2023-10-12 RX ORDER — HYDROCORTISONE ACETATE 25 MG/1
25 SUPPOSITORY RECTAL
Qty: 14 | Refills: 0 | Status: DISCONTINUED | COMMUNITY
Start: 2022-09-14 | End: 2023-10-12

## 2023-10-12 RX ORDER — OXYCODONE AND ACETAMINOPHEN 5; 325 MG/1; MG/1
5-325 TABLET ORAL EVERY 6 HOURS
Qty: 8 | Refills: 0 | Status: DISCONTINUED | COMMUNITY
Start: 2021-04-15 | End: 2023-10-12

## 2023-10-12 RX ORDER — OXYCODONE AND ACETAMINOPHEN 5; 325 MG/1; MG/1
5-325 TABLET ORAL
Qty: 2 | Refills: 0 | Status: DISCONTINUED | COMMUNITY
Start: 2021-05-06 | End: 2023-10-12

## 2023-10-12 RX ORDER — IBUPROFEN 800 MG
TABLET ORAL
Refills: 0 | Status: DISCONTINUED | COMMUNITY
End: 2023-10-12

## 2023-10-12 RX ORDER — HYDROCORTISONE 25 MG/G
2.5 CREAM TOPICAL
Qty: 1 | Refills: 1 | Status: DISCONTINUED | COMMUNITY
Start: 2021-05-27 | End: 2023-10-12

## 2023-10-12 RX ORDER — ASPIRIN 81 MG
6.5 TABLET, DELAYED RELEASE (ENTERIC COATED) ORAL
Qty: 1 | Refills: 0 | Status: DISCONTINUED | COMMUNITY
Start: 2019-12-24 | End: 2023-10-12

## 2023-10-12 RX ORDER — COCOA BUTTER, PHENYLEPHRINE HYDROCHLORIDE 2211; 6.25 MG/1; MG/1
0.25-88.44 SUPPOSITORY RECTAL
Qty: 1 | Refills: 0 | Status: DISCONTINUED | COMMUNITY
Start: 2019-12-24 | End: 2023-10-12

## 2023-10-16 LAB
C TRACH RRNA SPEC QL NAA+PROBE: NOT DETECTED
HCV AB SER QL: NONREACTIVE
HCV S/CO RATIO: 0.06 S/CO
HIV1+2 AB SPEC QL IA.RAPID: NONREACTIVE
M TB IFN-G BLD-IMP: NEGATIVE
N GONORRHOEA RRNA SPEC QL NAA+PROBE: NOT DETECTED
QUANTIFERON TB PLUS MITOGEN MINUS NIL: 0.54 IU/ML
QUANTIFERON TB PLUS NIL: 0.02 IU/ML
QUANTIFERON TB PLUS TB1 MINUS NIL: -0.01 IU/ML
QUANTIFERON TB PLUS TB2 MINUS NIL: -0.01 IU/ML
SOURCE AMPLIFICATION: NORMAL
T PALLIDUM AB SER QL IA: NEGATIVE

## 2023-12-25 ENCOUNTER — NON-APPOINTMENT (OUTPATIENT)
Age: 33
End: 2023-12-25

## 2023-12-26 ENCOUNTER — APPOINTMENT (OUTPATIENT)
Dept: DERMATOLOGY | Facility: CLINIC | Age: 33
End: 2023-12-26
Payer: COMMERCIAL

## 2023-12-26 DIAGNOSIS — D22.9 MELANOCYTIC NEVI, UNSPECIFIED: ICD-10-CM

## 2023-12-26 DIAGNOSIS — L70.0 ACNE VULGARIS: ICD-10-CM

## 2023-12-26 DIAGNOSIS — L85.3 XEROSIS CUTIS: ICD-10-CM

## 2023-12-26 DIAGNOSIS — L73.0 ACNE KELOID: ICD-10-CM

## 2023-12-26 PROCEDURE — 99204 OFFICE O/P NEW MOD 45 MIN: CPT

## 2023-12-26 RX ORDER — ADAPALENE AND BENZOYL PEROXIDE 3; 25 MG/G; MG/G
0.3-2.5 GEL TOPICAL
Qty: 1 | Refills: 3 | Status: ACTIVE | COMMUNITY
Start: 2023-12-26 | End: 1900-01-01

## 2023-12-26 NOTE — ASSESSMENT
[FreeTextEntry1] : #Skin check #Benign appearing nevi Patient was reassured of the benign nature of these findings. No further treatment needed at this time. If any lesion changes or becomes symptomatic I recommend follow-up  # Acne vulgaris/ moderate to severe with scarring  -start epiduo forte qAM. thin layer to entire face. Instructed to decrease to 3x/ weekly if too irritating. Instructed to d/c if pregnant.  -vanicream face wash BID, followed by epiduo forte in AM, followed by vanicream face moisturizer BID, followed by sunscreen in AM (neutrogena mineral spf 30 sample provided) -discussed spironolactone, she would like to defer  -d/c face oil  -f/u 3 months.  #Acne scarring - boxcar/ice pick -recommended starting treatment with acne first to get it under control prior to treatment for scarring.  -She would be a good candidate for TCA cross. We discussed risk of hyper and hypopigmentation from procedure. She can also consider microneedling as well, although we do not offer it as this clinic at this time.   # Xerosis Patient counseled about dry skin care and liberal application of emollient creams such as Vanicream at least daily after showering. Sample provided   F/U 3 months

## 2023-12-26 NOTE — HISTORY OF PRESENT ILLNESS
[FreeTextEntry1] : NPV- skin check , acne [de-identified] : Lara June a 33-year-old patient came today for the above.  1) black heads and acne on face. Worsens around menstraul cycle. Was prescribed a retinoid years ago, d/c'd because it made her face peel. Is currently not using anything. 2) bump and scarring on lower face.  3) dry skin on legs. Uses jergens.   Hx: no hx skin cancer Fhx: no family history of skin cancer

## 2023-12-26 NOTE — PHYSICAL EXAM
[FreeTextEntry3] : On the trunk and upper/lower extremities bilaterally, are multiple scattered tan to brown macules and papules with regular borders. Some of these were examined dermoscopically and had reassuring features.  On the cheeks, there are ice pick and box car scars Scattered open and closed comedones Indurated papule on right lower cheek

## 2024-03-19 ENCOUNTER — NON-APPOINTMENT (OUTPATIENT)
Age: 34
End: 2024-03-19

## 2024-03-22 ENCOUNTER — APPOINTMENT (OUTPATIENT)
Dept: DERMATOLOGY | Facility: CLINIC | Age: 34
End: 2024-03-22

## 2024-05-02 ENCOUNTER — NON-APPOINTMENT (OUTPATIENT)
Age: 34
End: 2024-05-02

## 2024-05-09 RX ORDER — ADAPALENE 3 MG/G
0.3 GEL TOPICAL
Qty: 1 | Refills: 2 | Status: ACTIVE | COMMUNITY
Start: 2024-05-09 | End: 1900-01-01

## 2024-05-09 RX ORDER — BENZOYL PEROXIDE 2.5 G/100G
2.5 GEL TOPICAL
Qty: 1 | Refills: 2 | Status: ACTIVE | COMMUNITY
Start: 2024-05-09 | End: 1900-01-01

## 2024-05-12 ENCOUNTER — NON-APPOINTMENT (OUTPATIENT)
Age: 34
End: 2024-05-12

## 2024-10-29 ENCOUNTER — APPOINTMENT (OUTPATIENT)
Dept: INTERNAL MEDICINE | Facility: CLINIC | Age: 34
End: 2024-10-29
Payer: COMMERCIAL

## 2024-10-29 VITALS
SYSTOLIC BLOOD PRESSURE: 114 MMHG | HEART RATE: 83 BPM | WEIGHT: 200 LBS | TEMPERATURE: 96.7 F | HEIGHT: 69 IN | BODY MASS INDEX: 29.62 KG/M2 | DIASTOLIC BLOOD PRESSURE: 75 MMHG | OXYGEN SATURATION: 99 %

## 2024-10-29 DIAGNOSIS — F41.9 ANXIETY DISORDER, UNSPECIFIED: ICD-10-CM

## 2024-10-29 DIAGNOSIS — L70.9 ACNE, UNSPECIFIED: ICD-10-CM

## 2024-10-29 DIAGNOSIS — Z72.51 HIGH RISK HETEROSEXUAL BEHAVIOR: ICD-10-CM

## 2024-10-29 DIAGNOSIS — Z11.1 ENCOUNTER FOR SCREENING FOR RESPIRATORY TUBERCULOSIS: ICD-10-CM

## 2024-10-29 DIAGNOSIS — R05.9 COUGH, UNSPECIFIED: ICD-10-CM

## 2024-10-29 DIAGNOSIS — L70.0 ACNE VULGARIS: ICD-10-CM

## 2024-10-29 PROCEDURE — 36415 COLL VENOUS BLD VENIPUNCTURE: CPT

## 2024-10-29 PROCEDURE — G2211 COMPLEX E/M VISIT ADD ON: CPT | Mod: NC

## 2024-10-29 PROCEDURE — 99214 OFFICE O/P EST MOD 30 MIN: CPT

## 2024-11-04 LAB
M TB IFN-G BLD-IMP: NEGATIVE
QUANTIFERON TB PLUS MITOGEN MINUS NIL: >10 IU/ML
QUANTIFERON TB PLUS NIL: 0.02 IU/ML
QUANTIFERON TB PLUS TB1 MINUS NIL: 0 IU/ML
QUANTIFERON TB PLUS TB2 MINUS NIL: 0 IU/ML

## 2025-07-17 ENCOUNTER — NON-APPOINTMENT (OUTPATIENT)
Age: 35
End: 2025-07-17

## 2025-07-17 ENCOUNTER — APPOINTMENT (OUTPATIENT)
Dept: COLORECTAL SURGERY | Facility: CLINIC | Age: 35
End: 2025-07-17
Payer: COMMERCIAL

## 2025-07-17 VITALS
BODY MASS INDEX: 29.33 KG/M2 | HEIGHT: 69 IN | DIASTOLIC BLOOD PRESSURE: 70 MMHG | TEMPERATURE: 97.1 F | SYSTOLIC BLOOD PRESSURE: 104 MMHG | WEIGHT: 198 LBS | HEART RATE: 80 BPM

## 2025-07-17 PROCEDURE — 46221 LIGATION OF HEMORRHOID(S): CPT

## 2025-07-17 PROCEDURE — 99203 OFFICE O/P NEW LOW 30 MIN: CPT | Mod: 25

## 2025-07-17 PROCEDURE — 99213 OFFICE O/P EST LOW 20 MIN: CPT | Mod: 25

## 2025-08-21 ENCOUNTER — APPOINTMENT (OUTPATIENT)
Dept: COLORECTAL SURGERY | Facility: CLINIC | Age: 35
End: 2025-08-21
Payer: COMMERCIAL

## 2025-08-21 ENCOUNTER — NON-APPOINTMENT (OUTPATIENT)
Age: 35
End: 2025-08-21

## 2025-08-21 VITALS
WEIGHT: 198 LBS | BODY MASS INDEX: 29.33 KG/M2 | SYSTOLIC BLOOD PRESSURE: 114 MMHG | DIASTOLIC BLOOD PRESSURE: 74 MMHG | TEMPERATURE: 98 F | HEIGHT: 69 IN | HEART RATE: 66 BPM

## 2025-08-21 DIAGNOSIS — K64.8 OTHER HEMORRHOIDS: ICD-10-CM

## 2025-08-21 PROCEDURE — 46221 LIGATION OF HEMORRHOID(S): CPT

## 2025-09-11 ENCOUNTER — NON-APPOINTMENT (OUTPATIENT)
Age: 35
End: 2025-09-11